# Patient Record
Sex: MALE | Race: WHITE
[De-identification: names, ages, dates, MRNs, and addresses within clinical notes are randomized per-mention and may not be internally consistent; named-entity substitution may affect disease eponyms.]

---

## 2023-02-08 ENCOUNTER — HOSPITAL ENCOUNTER (OUTPATIENT)
Dept: HOSPITAL 75 - PREOP | Age: 58
End: 2023-02-08
Attending: SURGERY
Payer: COMMERCIAL

## 2023-02-08 VITALS — WEIGHT: 155.65 LBS | BODY MASS INDEX: 23.05 KG/M2 | HEIGHT: 69.02 IN

## 2023-02-08 DIAGNOSIS — Z01.818: Primary | ICD-10-CM

## 2023-02-20 ENCOUNTER — HOSPITAL ENCOUNTER (OUTPATIENT)
Dept: HOSPITAL 75 - ENDO | Age: 58
Discharge: HOME | End: 2023-02-20
Attending: SURGERY
Payer: COMMERCIAL

## 2023-02-20 VITALS — SYSTOLIC BLOOD PRESSURE: 115 MMHG | DIASTOLIC BLOOD PRESSURE: 71 MMHG

## 2023-02-20 VITALS — WEIGHT: 155.65 LBS | BODY MASS INDEX: 23.05 KG/M2 | HEIGHT: 69.02 IN

## 2023-02-20 VITALS — DIASTOLIC BLOOD PRESSURE: 81 MMHG | SYSTOLIC BLOOD PRESSURE: 129 MMHG

## 2023-02-20 VITALS — SYSTOLIC BLOOD PRESSURE: 194 MMHG | DIASTOLIC BLOOD PRESSURE: 102 MMHG

## 2023-02-20 DIAGNOSIS — K62.1: ICD-10-CM

## 2023-02-20 DIAGNOSIS — F17.210: ICD-10-CM

## 2023-02-20 DIAGNOSIS — D12.2: Primary | ICD-10-CM

## 2023-02-20 DIAGNOSIS — Z28.310: ICD-10-CM

## 2023-02-20 DIAGNOSIS — K64.8: ICD-10-CM

## 2023-02-20 DIAGNOSIS — K57.31: ICD-10-CM

## 2023-02-20 PROCEDURE — 88305 TISSUE EXAM BY PATHOLOGIST: CPT

## 2023-02-20 NOTE — ENDOSCOPY DISCHARGE INSTRUCT
Endo Procedure/Findings


Findings


1.:  Polyp


2.:  Diverticulosis


3.:  Internal Hemorrhoids





Discharge Instructions


-


Activity: You might feel a little sleepy until tomorrow.  This is due to the 

medicine you received to relax you.





Until tomorrow, you should:  


   NOT drive a car, operate machinery or power tools.


   NOT drink any alcoholic beverages.


   NOT make any important decisions or sign importortant papers.





Do not return to work until tomorrow, unless otherwise instructed. Resume 

previous activities tomorrow.





Diet: Start by taking liquids.  If you tolerate liquids, advance to solid food.


1.:  Colonscopy in 5 years





Notify Physician


-


If you experience excessive bleeding, unusual abdominal pain, fever, or chest 

pain, contact your doctor immediately.











LIBRADO PATIÑO DO               Feb 20, 2023 09:51

## 2023-02-20 NOTE — PROGRESS NOTE-POST OPERATIVE
Post-Operative Progess Note


Surgeon (s)/Assistant (s)


Surgeon


LIBRADO PATIÑO DO


Assistant:  ANGIE Ibanez





Pre-Operative Diagnosis


Hematochezia





Post-Operative Diagnosis





Polyp


diverticula


int hemorrhoids





Procedure & Operative Findings


Date of Procedure


2/20/23


Procedure Performed/Findings


Colonoscopy with snare polypectomy





PROCEDURE NOTE:


After informed consent was obtained, the patient was brought to the endoscopy


suite, placed in bed in left lateral decubitus position.  He  was administered


IV sedation by the CRNA who then monitored his vitals the entire time, heart


rate, blood pressure and pulse ox and the scope was inserted, pushed all the way


to about 150 cm and pushed into the cecum, took a picture of appendiceal orifice

and 


noted the ileocecal valve. On the way in had noted diverticula on the left side 

and took


a picture of them.  Then slowly withdrew the scope insufflating to look 

circumferentially 


at the walls starting in the cecum and up the ascending colon.  Found a polyp in

the


ascending colon and removed it with snare polypectomy.  Continued up to the 

hepatic 


flexure, then down the transverse colon, splenic flexure, into the descending 

colon, 


down into the sigmoid and then into the rectal vault.  Found two polyps in the 

rectum


and removed these with snare.  Finally, retroflexed the scope and took a picture




of the internal hemorrhoids.  





The patient tolerated the procedure.  He was recovered in endoscopy suite.





Recommended for repeat colonoscopy in 5 years.


Anesthesia Type


IV sedation by CRNA





Estimated Blood Loss


Estimated blood loss (mL):  scant





Specimens/Packing


Specimens Removed


Asc colon polyp


rectal polyp x 2











LIBRADO PATIÑO DO               Feb 20, 2023 09:50

## 2023-02-20 NOTE — PROGRESS NOTE-PRE OPERATIVE
Pre-Operative Progress Note


Date of Available H&P:  Jan 31, 2023


Date H&P Reviewed:  Feb 20, 2023


Time H&P Reviewed:  08:10


History & Physical:  H&P Reviewed, Patient Examed, No changes noted


Pre-Operative Diagnosis:  Hematochezia











LIBRADO PATIÑO DO               Feb 20, 2023 08:14

## 2023-02-20 NOTE — ANESTHESIA-GENERAL POST-OP
MAC


Patient Condition


Mental Status/LOC:  Same as Preop


Cardiovascular:  Satisfactory


Nausea/Vomiting:  Absent


Respiratory:  Satisfactory


Pain:  Controlled


Complications:  Absent





Post Op Complications


Complications


None





Follow Up Care/Instructions


Patient Instructions


None needed.





Anesthesiology Discharge Order


Discharge Order


Patient is doing well, no complaints, stable vital signs, no apparent adverse 

anesthesia problems.   


No complications reported per nursing.











CINDY DILLON CRNA            Feb 20, 2023 09:48